# Patient Record
(demographics unavailable — no encounter records)

---

## 2024-10-09 NOTE — CONSULT LETTER
[Dear  ___] : Dear  [unfilled], [Consult Letter:] : I had the pleasure of evaluating your patient, [unfilled]. [Consult Closing:] : Thank you very much for allowing me to participate in the care of this patient.  If you have any questions, please do not hesitate to contact me. [Sincerely,] : Sincerely, [FreeTextEntry2] : Dr. Augusto Zambrano (pulm/referring)

## 2024-10-09 NOTE — ASSESSMENT
[FreeTextEntry1] : Mr. RYAN FERRARA, 82 year old male, former smoker, w/ hx of Afib on amiodarone and Eliquis, aortic aneurysm, aortic regurgitation, CHF, HTN, HLD and OA who presented for lung nodule in the left perihilar region along with mixed density in the right upper lobe, referred by Dr. Augusto Zambrano (pulm).  CT chest on 10/20/2020 ([facility]): - Evaluation of the lung windows again demonstrates the presence of scattered bilateral subcentimeter noncalcified pulmonary nodules, the largest of which is identified measuring 6 x 8 mm in the right lower lobe posteriorly on image 83.  - There is stable scarring at the left lung apex and in the right lower lobe.  - The right upper lobe bronchus appears to arise directly from the trachea, a normal anatomic variant. - The ascending thoracic aorta measures 4.4 x 4.6 cm and the descending thoracic aorta measures 3.4 x 3.6 cm without significant change.  - There is partial calcification of the wall of the gallbladder.  - There is a 1.9 x 2.1 cm cyst in the right lobe of the liver.  - There are possible bilateral upper lobe parapelvic cysts.  CT chest on 9/19/2024 ([facility]): - Stable few bilateral pulmonary nodules, with representative nodules including7 mm round with adjacent interstitial changes posterior right lower lung, currently series 3 image 80 previously series 3 image 83 - There is a new spiculated noncalcified nodule in the left perihilar midlung, axial series 3 image 61, coronal series 601 image 41 - There is a new mixed density groundglass nodule 12 x 9 mm right lateral upper lung anteriorly, series 3 image 30. - Ascending thoracic aorta mildly dilated, measuring up to 4.7 cm cm. - Descending thoracic aorta normal in caliber.  - Mild atherosclerotic calcification throughout aorta and major mediastinal vessels.  - There is a 10 mm nodule adjacent to a vessel in the left posterior hilum more evident on the current exam than previous, series 2 image 31 currently previously series 2 image 31. - Heterogeneous enlargement of the left thyroid with dominant mass measuring 3 cm unchanged from prior. - Status post cholecystectomy. - Simple liver cyst medial posterior left liver 2.5 cm. - Cyst as well in the included central peripelvic kidneys and lateral right anterior mid kidney cortex. - There is dilation of the extrahepatic common bile duct 14 mm in diameter without definite central intrahepatic bile duct dilatation. - 9 mm nodule left adrenal gland body retrospectively similar, currently series 2 image 63 previously series 2 image 64.  PFT on 10/08/2024: FVC 86% FEV1 76% DLCO 104%  PET scheduled for 10/10/24  I have reviewed the patient's medical records and diagnostic images at time of this office consultation and have made the following recommendation: 1. CT chest shows B/L lung nodules with unclear etiology, will plan for right lung resection first. Recommended RA RUL wedge resection with IR marking for diagnostic and therapeutic purposes. Risks and benefits and alternatives explained to patient, all questions answered, patient agreed to proceed with surgery/procedure. 2. PET scheduled for 10/10, will do TTM after to discuss findings. 3. Cardiac clearance prior  PREOPERATIVE CHECKLIST: (Discussed with patient) - Confirm allergies, including latex: Sulfa - Confirm pacemaker: none - Anticoagulation/antiplatelets noted and will be discontinued/continued: Hold Eliquis 3 days prior - SGLT-2 Inhibitors (discontinued 3 days prior to surgery) or GLP-1 (discontinued 1 week prior to surgery): none - All other supplements, NSAIDs and fish oil were discussed and will be held one week before surgery.   I, ADY Bangura, personally performed the evaluation and management (E/M) services for this new patient. That E/M includes conducting the initial examination, assessing all conditions, and establishing the plan of care. Today, my ACP, DANAY Boss was here to observe my evaluation and management services for this patient to be followed going forward.

## 2024-10-09 NOTE — PHYSICAL EXAM
[Fully active, able to carry on all pre-disease performance without restriction] : Status 0 - Fully active, able to carry on all pre-disease performance without restriction [General Appearance - Alert] : alert [General Appearance - In No Acute Distress] : in no acute distress [Sclera] : the sclera and conjunctiva were normal [PERRL With Normal Accommodation] : pupils were equal in size, round, and reactive to light [Outer Ear] : the ears and nose were normal in appearance [Oropharynx] : the oropharynx was normal [Neck Appearance] : the appearance of the neck was normal [Auscultation Breath Sounds / Voice Sounds] : lungs were clear to auscultation bilaterally [Apical Impulse] : the apical impulse was normal [Heart Rate And Rhythm] : heart rate was normal and rhythm regular [Examination Of The Chest] : the chest was normal in appearance [Chest Visual Inspection Thoracic Asymmetry] : no chest asymmetry [Diminished Respiratory Excursion] : normal chest expansion [2+] : left 2+ [Bowel Sounds] : normal bowel sounds [Abdomen Soft] : soft [Cervical Lymph Nodes Enlarged Posterior Bilaterally] : posterior cervical [Cervical Lymph Nodes Enlarged Anterior Bilaterally] : anterior cervical [No CVA Tenderness] : no ~M costovertebral angle tenderness [No Spinal Tenderness] : no spinal tenderness [Abnormal Walk] : normal gait [Nail Clubbing] : no clubbing  or cyanosis of the fingernails [Musculoskeletal - Swelling] : no joint swelling seen [Motor Tone] : muscle strength and tone were normal [Skin Color & Pigmentation] : normal skin color and pigmentation [Skin Turgor] : normal skin turgor [] : no rash [Deep Tendon Reflexes (DTR)] : deep tendon reflexes were 2+ and symmetric [Sensation] : the sensory exam was normal to light touch and pinprick [No Focal Deficits] : no focal deficits [Oriented To Time, Place, And Person] : oriented to person, place, and time [Impaired Insight] : insight and judgment were intact [Affect] : the affect was normal

## 2024-10-09 NOTE — HISTORY OF PRESENT ILLNESS
[FreeTextEntry1] : Mr. RYAN FERRARA, 82 year old male, former smoker, w/ hx of Afib on amiodarone and Eliquis, aortic aneurysm, aortic regurgitation, CHF, HTN, HLD and OA who presented for 7mmlung nodule in the left perihilar region along with mixed density in the right upper lobe which seems to have increased in size, referred by Dr. Augusto Zambrano (pulm).  CT chest on 10/20/2020 ([facility]): - Evaluation of the lung windows again demonstrates the presence of scattered bilateral subcentimeter noncalcified pulmonary nodules, the largest of which is identified measuring 6 x 8 mm in the right lower lobe posteriorly on image 83.  - There is stable scarring at the left lung apex and in the right lower lobe.  - The right upper lobe bronchus appears to arise directly from the trachea, a normal anatomic variant. - The ascending thoracic aorta measures 4.4 x 4.6 cm and the descending thoracic aorta measures 3.4 x 3.6 cm without significant change.  - There is partial calcification of the wall of the gallbladder.  - There is a 1.9 x 2.1 cm cyst in the right lobe of the liver.  - There are possible bilateral upper lobe parapelvic cysts.  CT chest on 9/19/2024 ([facility]): - Stable few bilateral pulmonary nodules, with representative nodules including7 mm round with adjacent interstitial changes posterior right lower lung, currently series 3 image 80 previously series 3 image 83 - There is a new spiculated noncalcified nodule in the left perihilar midlung, axial series 3 image 61, coronal series 601 image 41 - There is a new mixed density groundglass nodule 12 x 9 mm right lateral upper lung anteriorly, series 3 image 30. - Ascending thoracic aorta mildly dilated, measuring up to 4.7 cm cm. - Descending thoracic aorta normal in caliber.  - Mild atherosclerotic calcification throughout aorta and major mediastinal vessels.  - There is a 10 mm nodule adjacent to a vessel in the left posterior hilum more evident on the current exam than previous, series 2 image 31 currently previously series 2 image 31. - Heterogeneous enlargement of the left thyroid with dominant mass measuring 3 cm unchanged from prior. - Status post cholecystectomy. - Simple liver cyst medial posterior left liver 2.5 cm. - Cyst as well in the included central peripelvic kidneys and lateral right anterior mid kidney cortex. - There is dilation of the extrahepatic common bile duct 14 mm in diameter without definite central intrahepatic bile duct dilatation. - 9 mm nodule left adrenal gland body retrospectively similar, currently series 2 image 63 previously series 2 image 64.  PFT on 10/08/2024: FVC 86% FEV1 76% DLCO 104%  PET scheduled for 10/10/24  Pt presents today for CT Sx consultation. He denies any SOB, chest pain, cough, fever or chills.

## 2024-10-09 NOTE — DATA REVIEWED
[FreeTextEntry1] : I have independently reviewed the following: CT chest on 10/20/2020  CT chest on 9/19/2024

## 2024-10-29 NOTE — ASSESSMENT
[FreeTextEntry1] : Mr. RYAN FERRARA, 82 year old male, former smoker, w/ hx of Afib on amiodarone and Eliquis, aortic aneurysm, aortic regurgitation, CHF, HTN, HLD and OA who presented for lung nodule in the left perihilar region along with mixed density in the right upper lobe, referred by Dr. Augusto Zambrano (pulm).   I have reviewed the patient's medical records and diagnostic images at time of this office consultation and have made the following recommendation: 1.      I, ADY Bangura, personally performed the evaluation and management (E/M) services for this established patient who presents today with (a) new problem(s)/exacerbation of (an) existing condition(s). That E/M includes conducting the examination, assessing all new/exacerbated conditions, and establishing a new plan of care. Today, my ACP, DANAY Boss, was here to observe my evaluation and management services for this new problem/exacerbated condition to be followed going forward.

## 2024-10-29 NOTE — DATA REVIEWED
[FreeTextEntry1] : I have independently reviewed the following: PET on 10/10/24 + toleration observed without overt symptoms of penetration/aspiration

## 2024-10-29 NOTE — HISTORY OF PRESENT ILLNESS
[FreeTextEntry1] : Mr. RYAN FERRARA, 82 year old male, former smoker, w/ hx of Afib on amiodarone and Eliquis, aortic aneurysm, aortic regurgitation, CHF, HTN, HLD and OA who presented for 7mmlung nodule in the left perihilar region along with mixed density in the right upper lobe which seems to have increased in size, referred by Dr. Augusto Zambrano (pulm).  CT chest on 10/20/2020 ([facility]): - Evaluation of the lung windows again demonstrates the presence of scattered bilateral subcentimeter noncalcified pulmonary nodules, the largest of which is identified measuring 6 x 8 mm in the right lower lobe posteriorly on image 83.  - There is stable scarring at the left lung apex and in the right lower lobe.  - The right upper lobe bronchus appears to arise directly from the trachea, a normal anatomic variant. - The ascending thoracic aorta measures 4.4 x 4.6 cm and the descending thoracic aorta measures 3.4 x 3.6 cm without significant change.  - There is partial calcification of the wall of the gallbladder.  - There is a 1.9 x 2.1 cm cyst in the right lobe of the liver.  - There are possible bilateral upper lobe parapelvic cysts.  CT chest on 9/19/2024 ([facility]): - Stable few bilateral pulmonary nodules, with representative nodules including7 mm round with adjacent interstitial changes posterior right lower lung, currently series 3 image 80 previously series 3 image 83 - There is a new spiculated noncalcified nodule in the left perihilar midlung, axial series 3 image 61, coronal series 601 image 41 - There is a new mixed density groundglass nodule 12 x 9 mm right lateral upper lung anteriorly, series 3 image 30. - Ascending thoracic aorta mildly dilated, measuring up to 4.7 cm cm. - Descending thoracic aorta normal in caliber.  - Mild atherosclerotic calcification throughout aorta and major mediastinal vessels.  - There is a 10 mm nodule adjacent to a vessel in the left posterior hilum more evident on the current exam than previous, series 2 image 31 currently previously series 2 image 31. - Heterogeneous enlargement of the left thyroid with dominant mass measuring 3 cm unchanged from prior. - Status post cholecystectomy. - Simple liver cyst medial posterior left liver 2.5 cm. - Cyst as well in the included central peripelvic kidneys and lateral right anterior mid kidney cortex. - There is dilation of the extrahepatic common bile duct 14 mm in diameter without definite central intrahepatic bile duct dilatation. - 9 mm nodule left adrenal gland body retrospectively similar, currently series 2 image 63 previously series 2 image 64.  PFT on 10/08/2024: FVC 86% FEV1 76% DLCO 104%  PET on 10/10/24 (): - The left upper lobe nodule 14 mm is hypermetabolic, SUV 12.2. - The right upper lobe density 13 mm and adjacent groundglass nodule are nonavid. Additional smaller nodules are below PET resolution. - Bilateral mild hilar uptake are probably reactive. - A few mildly FDG avid small lymph nodes are seen in the hepatic portal/upper retroperitoneal and bilateral iliac regions, indeterminate, inflammatory (such as sarcoidosis) versus lymphoma.   Seen as NPA on 10/09: CT chest shows B/L lung nodules with unclear etiology, will plan for right lung resection first. Recommended RA RUL wedge resection with IR marking for diagnostic and therapeutic purposes. Risks and benefits and alternatives explained to patient, all questions answered, patient agreed to proceed with surgery/procedure. PET scheduled for 10/10, will do TTM after to discuss findings. Cardiac clearance prior  Patient presents today for follow up.

## 2024-10-29 NOTE — ASSESSMENT
[FreeTextEntry1] : Mr. RYAN FERRARA, 82 year old male, former smoker, w/ hx of Afib on amiodarone and Eliquis, aortic aneurysm, aortic regurgitation, CHF, HTN, HLD and OA who presented for lung nodule in the left perihilar region along with mixed density in the right upper lobe, referred by Dr. Augusto Zambrano (pulm).   I have reviewed the patient's medical records and diagnostic images at time of this office consultation and have made the following recommendation: 1.      I, ADY Bangura, personally performed the evaluation and management (E/M) services for this established patient who presents today with (a) new problem(s)/exacerbation of (an) existing condition(s). That E/M includes conducting the examination, assessing all new/exacerbated conditions, and establishing a new plan of care. Today, my ACP, DANAY Boss, was here to observe my evaluation and management services for this new problem/exacerbated condition to be followed going forward.               Birth Control Pills Pregnancy And Lactation Text: This medication should be avoided if pregnant and for the first 30 days post-partum.